# Patient Record
(demographics unavailable — no encounter records)

---

## 2024-12-19 NOTE — PHYSICAL EXAM
[General Appearance - Alert] : alert [General Appearance - In No Acute Distress] : in no acute distress [Auscultation Breath Sounds / Voice Sounds] : lungs were clear to auscultation bilaterally [Heart Rate And Rhythm] : heart rate was normal and rhythm regular [Heart Sounds] : normal S1 and S2 [Edema] : there was no peripheral edema [Abdomen Soft] : soft [Abdomen Tenderness] : non-tender [Cervical Lymph Nodes Enlarged Posterior Bilaterally] : posterior cervical [Cervical Lymph Nodes Enlarged Anterior Bilaterally] : anterior cervical [Supraclavicular Lymph Nodes Enlarged Bilaterally] : supraclavicular [FreeTextEntry1] : erythematous facial rash [Impaired Insight] : insight and judgment were intact [Affect] : the affect was normal [Musculoskeletal - Swelling] : no joint swelling seen

## 2024-12-19 NOTE — HISTORY OF PRESENT ILLNESS
[___ Month(s) Ago] : [unfilled] month(s) ago [FreeTextEntry1] : =pt taking plaquenil 200mg daily =no fevers, SOB, CP, abdominal pain, n/v/d, rashes, joint pain, swelling

## 2024-12-19 NOTE — ASSESSMENT
[FreeTextEntry1] : 51F w UCTD/lupus?  =pt reports alopecia, discoid lupus, arthritis? (hand pain, sides of hip pain) =labs 9/23 JACOBO 1:640 speckled ; RF 16; RNP 1.2, ESR 54 =Xrays 9/23 hands: wnl; xray hips: wnl  c/w plaquenil, as pt benefitting   Plan -Labs w serologies, inflammatory markers -plaquenil 200mg daily  RTO 3-4 months

## 2025-04-24 NOTE — HISTORY OF PRESENT ILLNESS
Detail Level: Simple [___ Month(s) Ago] : [unfilled] month(s) ago [FreeTextEntry1] : =pt taking hcq 200mg daily; gets to optho regularly for glaucoma  =pt R third PIP sometimes is swollen, and "crooked" =no fevers, SOB, CP, abdominal pain, n/v/d, rashes Detail Level: Detailed

## 2025-04-24 NOTE — ASSESSMENT
[FreeTextEntry1] : 51F w UCTD/lupus?  =pt reports alopecia, discoid lupus, arthritis? (hand pain, sides of hip pain) =labs 9/23 JACOBO 1:640 speckled ; RF 16; RNP 1.2, ESR 54 =Xrays 9/23 hands: wnl; xray hips: wnl  c/w plaquenil, as pt benefitting. Will obtain labs to assess for any AI activity.   Plan -Labs w serologies, inflammatory markers -plaquenil 200mg daily  RTO 3-4 months